# Patient Record
Sex: FEMALE
[De-identification: names, ages, dates, MRNs, and addresses within clinical notes are randomized per-mention and may not be internally consistent; named-entity substitution may affect disease eponyms.]

---

## 2018-09-05 ENCOUNTER — HOSPITAL (OUTPATIENT)
Dept: OTHER | Age: 58
End: 2018-09-05

## 2022-06-15 ENCOUNTER — TELEPHONE (OUTPATIENT)
Dept: SCHEDULING | Age: 62
End: 2022-06-15

## 2025-02-13 ENCOUNTER — TELEPHONE (OUTPATIENT)
Dept: ORTHOPEDICS CLINIC | Facility: CLINIC | Age: 65
End: 2025-02-13

## 2025-02-13 NOTE — TELEPHONE ENCOUNTER
Patient had an MRI over the weekend of her right foot and wants to know if Dr. Pearson has received it prior to her appointment on 2/19/25.  Please advise.

## 2025-03-17 ENCOUNTER — OFFICE VISIT (OUTPATIENT)
Dept: PODIATRY CLINIC | Facility: CLINIC | Age: 65
End: 2025-03-17

## 2025-03-17 DIAGNOSIS — S86.311A PERONEAL TENDON TEAR, RIGHT, INITIAL ENCOUNTER: ICD-10-CM

## 2025-03-17 DIAGNOSIS — S86.111A POSTERIOR TIBIAL TENDON TEAR, TRAUMATIC, RIGHT, INITIAL ENCOUNTER: Primary | ICD-10-CM

## 2025-03-17 RX ORDER — LOSARTAN POTASSIUM 50 MG/1
50 TABLET ORAL DAILY
COMMUNITY

## 2025-03-17 RX ORDER — FEXOFENADINE HCL 180 MG/1
180 TABLET ORAL DAILY
COMMUNITY
Start: 2023-12-11

## 2025-03-17 RX ORDER — HYDROXYCHLOROQUINE SULFATE 200 MG/1
200 TABLET, FILM COATED ORAL EVERY MORNING
COMMUNITY
Start: 2024-05-13

## 2025-03-17 RX ORDER — BUDESONIDE 3 MG/1
3 CAPSULE, COATED PELLETS ORAL DAILY
COMMUNITY

## 2025-03-17 RX ORDER — ROSUVASTATIN CALCIUM 5 MG/1
5 TABLET, COATED ORAL NIGHTLY
COMMUNITY

## 2025-03-17 NOTE — PROGRESS NOTES
Reason for Visit      Lucy Shen is a 64 year old female presents today complaining of right foot pain.     History of Present Illness     Patient presents to clinic today after last being seen by me on 10/1/2024 for evaluation of her right lower extremity.  Patient subsequently underwent an MRI in February 2015 and missed her follow-up appointment with me due to hospital admission.  Patient has had a history of plantar fasciitis to the right lower extremity but also has lumbar spine arthritis.  Patient presents to clinic today in good supportive shoes and orthotics.  Patient has very minimal pain only pain in the plantar aspect of her right heel.    The following portions of the patient's history were reviewed and updated as appropriate: allergies, current medications, past family history, past medical history, past social history, past surgical history and problem list.    Allergies[1]    No current outpatient medications on file.    There are no discontinued medications.    There is no problem list on file for this patient.      No past medical history on file.    No past surgical history on file.    No family history on file.    Social History     Occupational History    Not on file   Tobacco Use    Smoking status: Not on file    Smokeless tobacco: Not on file   Substance and Sexual Activity    Alcohol use: Not on file    Drug use: Not on file    Sexual activity: Not on file       ROS      Constitutional: negative for chills, fevers and sweats  Gastrointestinal: negative for abdominal pain, diarrhea, nausea and vomiting  Genitourinary:negative for dysuria and hematuria  Musculoskeletal:negative for arthralgias and muscle weakness  Neurological: negative for paresthesia and weakness  All others reviewed and negative.      Physical Exam     LE PHYSICAL EXAM    Gen: A&O x3 and NAD  Inspection and palpation revealed: Pain on palpation to the third interspace of the Right foot. Positive Beckman sign. Negative  Lachman test to the second and third MPJs, Right. No POP sub 2nd and 3rd MTH, Right. - RESOLVED (2)Abduction of the proximal phalanx with associated dorsal-medial bony prominence of the first metatarsal head, reducible in nature Right foot (3) Contracted PIPJ and DIPJ, reducible in nature. Mild dorsal subluxation of the MPJ with mild POP sub MTH, second digit right (4) Inspection and palpation revealed: Pain on palpation to the medial calcaneal tubercle and along the medial band of the planter fascia, Right foot. Mild pain on palpation to the insertion of the Achilles tendon, Right No pain along the course of the posterior tibial tendon, Right. Negative Tinnel or Valleix sign, right No pain with compression of the heel right   Integ: warm, dry, supple and without open lesions or macerations b/l  Vasc: DP and PT are palpable b/l, CFT < 3 secs 1-5 b/l, no edema noted right and hair present to digits  Neuro: Sensation intact and via semmes-diya 5.07 momofilament b/l  M/S: 5/5 muscle strength noted to the LE b/l.  BIOMECHANICAL:   RIGHT LE:   AJ ROM:5 DF/ 20 PF with knee extended; 10 DF/ 20 PF with knee flexed. No bony or soft tissue anterior impingement noted.   STJ ROM: WNL. 30 inv/ 10 ever  Forefoot is balanced position to rearfoot.   First ray: No hypermobility/elevatus/plantarflexion noted.  FIRST MTP ROM: smooth with no crepitation noted. 15 DF/ 10 PF.     Mild pain on palpation along the posterior tibial tendon and plantar heel.  No pain with dorsiflexion plantarflexion inversion eversion against resistance.  No pain with standing on single leg though completely unstable and imbalance.      Assessment and Plan     Encounter Diagnoses   Name Primary?    Posterior tibial tendon tear, traumatic, right, initial encounter Yes    Peroneal tendon tear, right, initial encounter    Reviewed MRI in great detail which noted a tear of the posterior tibial tendon peroneal tendon and a ganglion cyst in her calcaneus as  well as plantar fasciitis and Achilles tendinitis.  Patient is doing significantly better with the fact that she was recently hospitalized and off her foot for 4 weeks.  Do not recommend immobilization at this point would rather move forward with physical therapy.  If she has any sort of signs of pain or weakness that she does not improve we will recommend reached out we will get a repeat MRI    Patient was instructed to call the office or on-call podiatric physician immediately with any issues or concerns before the next scheduled visit. Patient to follow-up in clinic in after physical therapy      Tawana Naqvi DPM, [email or web address] FACOLIVE  Diplomat, American Board of Foot and Ankle Surgery  Certified in Foot and Rearfoot/Ankle Reconstruction  Fellow of the American College of Foot and Ankle Surgeons  Fellowship Trained Foot and Ankle Surgeon   [facility]     3/17/2025    7:33 AM         [1] Not on File

## 2025-04-17 ENCOUNTER — MED REC SCAN ONLY (OUTPATIENT)
Dept: ORTHOPEDICS CLINIC | Facility: CLINIC | Age: 65
End: 2025-04-17